# Patient Record
Sex: FEMALE | Race: WHITE | NOT HISPANIC OR LATINO | URBAN - METROPOLITAN AREA
[De-identification: names, ages, dates, MRNs, and addresses within clinical notes are randomized per-mention and may not be internally consistent; named-entity substitution may affect disease eponyms.]

---

## 2024-07-31 ENCOUNTER — OFFICE VISIT (OUTPATIENT)
Dept: URBAN - METROPOLITAN AREA CLINIC 80 | Facility: CLINIC | Age: 24
End: 2024-07-31

## 2024-07-31 ENCOUNTER — DASHBOARD ENCOUNTERS (OUTPATIENT)
Age: 24
End: 2024-07-31

## 2024-07-31 VITALS
TEMPERATURE: 97.3 F | WEIGHT: 163.4 LBS | DIASTOLIC BLOOD PRESSURE: 74 MMHG | BODY MASS INDEX: 24.77 KG/M2 | SYSTOLIC BLOOD PRESSURE: 117 MMHG | HEIGHT: 68 IN | HEART RATE: 66 BPM

## 2024-07-31 PROBLEM — 14760008: Status: ACTIVE | Noted: 2024-07-31

## 2024-07-31 RX ORDER — HYDROCORTISONE ACETATE 25 MG/1
1 SUPPOSITORY SUPPOSITORY RECTAL ONCE A DAY
Qty: 14 SUPPOSITORY | Refills: 0 | OUTPATIENT
Start: 2024-07-31 | End: 2024-08-14

## 2024-07-31 NOTE — PHYSICAL EXAM GASTROINTESTINAL
Abdomen , soft, nontender, nondistended , no guarding or rigidity , no masses palpable , normal bowel sounds Liver and Spleen , no hepatosplenomegaly Rectal , normal sphincter tone, + internal hemorrhoids, no external hemorrhoids, no rectal masses, no bleeding present. A chaperone was present during the rectal examination

## 2024-07-31 NOTE — HPI-TODAY'S VISIT:
The patient presents today due to the following symptoms: constipation and blood in stool    Patient notes 1.5 weeks ago she had an episode of generalized abdominal pain after not having a BM for 3 days along with nausea. She went to , had a U/A and pregnancy test both of which were normal. Patient was sent home with an rx for nausea medication. She took prune juice and she had a BM that was more liquid. These symptoms improved and notes no further pain or nausea.      Prior to 1.5 weeks ago, pt would have a BM once a day and well formed.     Has not had issues with constipation in the past.     She started noticing blood in her stool about a week ago, seen in both on the tissue and in the toilet. After every BM for the past week. Patient notes that currently her she is having a BM once every couple of days. Patient notes that stools are not hard and currently she does not feel constipated. Patient also notes some rectal pain with BM for the past week.   Nothing seems to make this pain or bleeding better or worse.     Denies family history of GI related cancers.     never had a colonoscopy. No family history of colon cancer. Mother had colon polyps. Patient denies current nausea, heartburn, dysphagia, current abdominal pain, melena, unintentional weight loss, and loss of appetite. Patient denies blood thinner use, pacemaker/defibrillator, diabetes, kidney disease, and home O2.  denies fever/chills.

## 2024-08-12 ENCOUNTER — CLAIMS CREATED FROM THE CLAIM WINDOW (OUTPATIENT)
Dept: URBAN - METROPOLITAN AREA SURGERY CENTER 19 | Facility: SURGERY CENTER | Age: 24
End: 2024-08-12
Payer: COMMERCIAL

## 2024-08-12 ENCOUNTER — CLAIMS CREATED FROM THE CLAIM WINDOW (OUTPATIENT)
Dept: URBAN - METROPOLITAN AREA CLINIC 4 | Facility: CLINIC | Age: 24
End: 2024-08-12
Payer: COMMERCIAL

## 2024-08-12 DIAGNOSIS — K64.8 OTHER HEMORRHOIDS: ICD-10-CM

## 2024-08-12 DIAGNOSIS — R19.4 CHANGE IN BOWEL HABIT: ICD-10-CM

## 2024-08-12 DIAGNOSIS — K92.1 HEMATOCHEZIA: ICD-10-CM

## 2024-08-12 DIAGNOSIS — K63.89 OTHER SPECIFIED DISEASES OF INTESTINE: ICD-10-CM

## 2024-08-12 DIAGNOSIS — K92.1 MELENA: ICD-10-CM

## 2024-08-12 PROCEDURE — 88305 TISSUE EXAM BY PATHOLOGIST: CPT | Performed by: PATHOLOGY

## 2024-08-12 PROCEDURE — 00811 ANES LWR INTST NDSC NOS: CPT | Performed by: NURSE ANESTHETIST, CERTIFIED REGISTERED

## 2024-08-12 PROCEDURE — 45380 COLONOSCOPY AND BIOPSY: CPT | Performed by: INTERNAL MEDICINE

## 2024-08-12 RX ORDER — HYDROCORTISONE ACETATE 25 MG/1
1 SUPPOSITORY SUPPOSITORY RECTAL ONCE A DAY
Qty: 14 SUPPOSITORY | Refills: 0 | Status: ACTIVE | COMMUNITY
Start: 2024-07-31 | End: 2024-08-14

## 2024-08-26 ENCOUNTER — OFFICE VISIT (OUTPATIENT)
Dept: URBAN - METROPOLITAN AREA CLINIC 80 | Facility: CLINIC | Age: 24
End: 2024-08-26

## 2024-08-27 ENCOUNTER — OFFICE VISIT (OUTPATIENT)
Dept: URBAN - METROPOLITAN AREA CLINIC 80 | Facility: CLINIC | Age: 24
End: 2024-08-27

## 2024-09-03 ENCOUNTER — OFFICE VISIT (OUTPATIENT)
Dept: URBAN - METROPOLITAN AREA CLINIC 80 | Facility: CLINIC | Age: 24
End: 2024-09-03

## 2024-09-03 NOTE — HPI-TODAY'S VISIT:
7/31/24: The patient presents today due to the following symptoms: constipation and blood in stool    Patient notes 1.5 weeks ago she had an episode of generalized abdominal pain after not having a BM for 3 days along with nausea. She went to , had a U/A and pregnancy test both of which were normal. Patient was sent home with an rx for nausea medication. She took prune juice and she had a BM that was more liquid. These symptoms improved and notes no further pain or nausea.      Prior to 1.5 weeks ago, pt would have a BM once a day and well formed.     Has not had issues with constipation in the past.     She started noticing blood in her stool about a week ago, seen in both on the tissue and in the toilet. After every BM for the past week. Patient notes that currently her she is having a BM once every couple of days. Patient notes that stools are not hard and currently she does not feel constipated. Patient also notes some rectal pain with BM for the past week.   Nothing seems to make this pain or bleeding better or worse.     Denies family history of GI related cancers.     never had a colonoscopy. No family history of colon cancer. Mother had colon polyps. Patient denies current nausea, heartburn, dysphagia, current abdominal pain, melena, unintentional weight loss, and loss of appetite. Patient denies blood thinner use, pacemaker/defibrillator, diabetes, kidney disease, and home O2.  denies fever/chills.  Patient was given hydrocortisone suppositories to treat internal hemorrhoids.    8/12/2024 colonoscopy: Internal hemorrhoids. Repeat at 47 for screening advised. Path showed no  significant abnormality

## 2024-09-05 ENCOUNTER — OFFICE VISIT (OUTPATIENT)
Dept: URBAN - METROPOLITAN AREA CLINIC 80 | Facility: CLINIC | Age: 24
End: 2024-09-05
Payer: COMMERCIAL

## 2024-09-05 VITALS
BODY MASS INDEX: 24.55 KG/M2 | HEART RATE: 80 BPM | SYSTOLIC BLOOD PRESSURE: 112 MMHG | HEIGHT: 68 IN | WEIGHT: 162 LBS | DIASTOLIC BLOOD PRESSURE: 70 MMHG | TEMPERATURE: 97.1 F

## 2024-09-05 DIAGNOSIS — K59.09 CHANGE IN BOWEL MOVEMENTS INTERMITTENT CONSTIPATION. URGENCY IN THE MORNING.: ICD-10-CM

## 2024-09-05 DIAGNOSIS — K64.8 EXTERNAL HEMORRHOIDS: ICD-10-CM

## 2024-09-05 DIAGNOSIS — K92.1 BLOOD IN STOOL: ICD-10-CM

## 2024-09-05 PROCEDURE — 99213 OFFICE O/P EST LOW 20 MIN: CPT

## 2024-09-05 RX ORDER — HYDROCORTISONE ACETATE 25 MG/1
1 SUPPOSITORY SUPPOSITORY RECTAL ONCE A DAY
Qty: 14 SUPPOSITORY | Refills: 0 | OUTPATIENT
Start: 2024-09-05 | End: 2024-09-19

## 2024-09-05 NOTE — HPI-TODAY'S VISIT:
7/31/24: The patient presents today due to the following symptoms: constipation and blood in stool    Patient notes 1.5 weeks ago she had an episode of generalized abdominal pain after not having a BM for 3 days along with nausea. She went to , had a U/A and pregnancy test both of which were normal. Patient was sent home with an rx for nausea medication. She took prune juice and she had a BM that was more liquid. These symptoms improved and notes no further pain or nausea.     Prior to 1.5 weeks ago, pt would have a BM once a day and well formed.     Has not had issues with constipation in the past.     She started noticing blood in her stool about a week ago, seen in both on the tissue and in the toilet. After every BM for the past week. Patient notes that currently her she is having a BM once every couple of days. Patient notes that stools are not hard and currently she does not feel constipated. Patient also notes some rectal pain with BM for the past week.   Nothing seems to make this pain or bleeding better or worse.     Denies family history of GI related cancers.     never had a colonoscopy. No family history of colon cancer. Mother had colon polyps. Patient denies current nausea, heartburn, dysphagia, current abdominal pain, melena, unintentional weight loss, and loss of appetite. Patient denies blood thinner use, pacemaker/defibrillator, diabetes, kidney disease, and home O2.  denies fever/chills.    Patient was given hydrocortisone suppositories to treat internal hemorrhoids. Pt was advised to continue prune juice for constipation. CBC and TSH were ordered but have not yet been completed.     8/12/2024 colonoscopy: Internal hemorrhoids. Repeat at 47 for screening advised. Path showed no  significant abnormality    Today: Patient notes that she has had some minor rectal bleeding, seen only on the tissue since colonoscopy. She reports abdominal pain, nausea, and rectal pain have completely resolved. Last time she had rectal bleeding was 2 days ago. She notices rectal bleeding about twice a week. pt reports only taking suppositories as needed. She used only a few suppositories. She would use one and then would wait until another episode of bleeding before using another suppository.   She notes that she has only needed to use prune juice once since last visit to "clean her out". She reports BM have been regular with 1 BM a day and well-formed since last visit.   Denies any other GI complaints

## 2024-11-28 NOTE — HPI-TODAY'S VISIT:
7/31/24: The patient presents today due to the following symptoms: constipation and blood in stool    Patient notes 1.5 weeks ago she had an episode of generalized abdominal pain after not having a BM for 3 days along with nausea. She went to , had a U/A and pregnancy test both of which were normal. Patient was sent home with an rx for nausea medication. She took prune juice and she had a BM that was more liquid. These symptoms improved and notes no further pain or nausea.      Prior to 1.5 weeks ago, pt would have a BM once a day and well formed.     Has not had issues with constipation in the past.     She started noticing blood in her stool about a week ago, seen in both on the tissue and in the toilet. After every BM for the past week. Patient notes that currently her she is having a BM once every couple of days. Patient notes that stools are not hard and currently she does not feel constipated. Patient also notes some rectal pain with BM for the past week.   Nothing seems to make this pain or bleeding better or worse.     Denies family history of GI related cancers.     never had a colonoscopy. No family history of colon cancer. Mother had colon polyps. Patient denies current nausea, heartburn, dysphagia, current abdominal pain, melena, unintentional weight loss, and loss of appetite. Patient denies blood thinner use, pacemaker/defibrillator, diabetes, kidney disease, and home O2.  denies fever/chills.  Patient was given hydrocortisone suppositories to treat internal hemorrhoids.    8/12/2024 colonoscopy: Internal hemorrhoids. Repeat at 47 for screening advised. Path showed no  significant abnormality
Opt out